# Patient Record
Sex: FEMALE | Race: WHITE | Employment: UNEMPLOYED | URBAN - METROPOLITAN AREA
[De-identification: names, ages, dates, MRNs, and addresses within clinical notes are randomized per-mention and may not be internally consistent; named-entity substitution may affect disease eponyms.]

---

## 2017-09-01 ENCOUNTER — MED REC SCAN ONLY (OUTPATIENT)
Dept: OBGYN CLINIC | Facility: CLINIC | Age: 52
End: 2017-09-01

## 2017-11-06 PROBLEM — F52.6 DYSPAREUNIA IN FEMALE NOT DUE TO SUBSTANCE OR KNOWN PHYSIOLOGICAL CONDITION: Status: ACTIVE | Noted: 2017-11-06

## 2017-11-06 PROBLEM — Z01.419 WELL WOMAN EXAM WITH ROUTINE GYNECOLOGICAL EXAM: Status: ACTIVE | Noted: 2017-11-06

## 2017-11-06 PROCEDURE — 88175 CYTOPATH C/V AUTO FLUID REDO: CPT | Performed by: OBSTETRICS & GYNECOLOGY

## 2017-11-06 PROCEDURE — 87624 HPV HI-RISK TYP POOLED RSLT: CPT | Performed by: OBSTETRICS & GYNECOLOGY

## 2017-11-06 NOTE — PROGRESS NOTES
Anthony Gamboa is a 46year old female  No LMP recorded (approximate). Patient has had an ablation. Patient presents with:  Wellness Visit: annual  .  She has no complaints. Denies postmenopausal bleeding, urinary or sexual issues.   Complains of LIGATION      Comment: BTL     SOCIAL HISTORY:    Social History  Social History   Marital status:   Spouse name: N/A    Years of education: N/A  Number of children: N/A     Occupational History  None on file     Social History Main Topics   Smoking adenopathy  Breast: normal without palpable masses, tenderness, asymmetry, nipple discharge, nipple retraction or skin changes  Abdomen:  soft, nontender, nondistended, no masses  Skin/Hair: no unusual rashes or bruises  Extremities: no edema, no cyanosis

## 2019-09-26 ENCOUNTER — TELEPHONE (OUTPATIENT)
Dept: OBGYN CLINIC | Facility: CLINIC | Age: 54
End: 2019-09-26

## 2019-09-26 NOTE — TELEPHONE ENCOUNTER
Pt requesting last pap to be faxed to  Dr. Martinez   [address]  NR#[phone]  NXN#[phone]      Faxed last pap per pts request.

## 2021-01-12 ENCOUNTER — IMMUNIZATION (OUTPATIENT)
Dept: LAB | Age: 56
End: 2021-01-12

## 2021-01-12 DIAGNOSIS — Z23 NEED FOR VACCINATION: Primary | ICD-10-CM

## 2021-01-12 PROCEDURE — 91301 COVID-19 MODERNA VACCINE: CPT

## 2021-01-12 PROCEDURE — 0011A COVID-19 MODERNA VACCINE: CPT

## 2021-02-09 ENCOUNTER — IMMUNIZATION (OUTPATIENT)
Dept: LAB | Age: 56
End: 2021-02-09
Attending: HOSPITALIST

## 2021-02-09 DIAGNOSIS — Z23 NEED FOR VACCINATION: Primary | ICD-10-CM

## 2021-02-09 PROCEDURE — 91301 COVID-19 MODERNA VACCINE: CPT

## 2021-02-09 PROCEDURE — 0012A COVID-19 MODERNA VACCINE: CPT
